# Patient Record
Sex: FEMALE | Race: WHITE | NOT HISPANIC OR LATINO | URBAN - METROPOLITAN AREA
[De-identification: names, ages, dates, MRNs, and addresses within clinical notes are randomized per-mention and may not be internally consistent; named-entity substitution may affect disease eponyms.]

---

## 2020-07-27 ENCOUNTER — TELEPHONE ENCOUNTER (OUTPATIENT)
Dept: URBAN - METROPOLITAN AREA CLINIC 40 | Facility: CLINIC | Age: 70
End: 2020-07-27

## 2021-02-02 ENCOUNTER — ERX REFILL RESPONSE (OUTPATIENT)
Age: 71
End: 2021-02-02

## 2021-02-02 RX ORDER — COLESTIPOL HYDROCHLORIDE 1 G/1
TAKE 1 TABLET BY MOUTH TWICE A DAY TABLET, FILM COATED ORAL
Qty: 180 | Refills: 3

## 2021-02-03 ENCOUNTER — OFFICE VISIT (OUTPATIENT)
Dept: URBAN - METROPOLITAN AREA CLINIC 74 | Facility: CLINIC | Age: 71
End: 2021-02-03
Payer: MEDICARE

## 2021-02-03 DIAGNOSIS — K52.832 LYMPHOCYTIC COLITIS: ICD-10-CM

## 2021-02-03 PROCEDURE — 99212 OFFICE O/P EST SF 10 MIN: CPT | Performed by: INTERNAL MEDICINE

## 2021-02-03 RX ORDER — BUSPIRONE HYDROCHLORIDE 30 MG/1
TAKE 2 TABLETS BY ORAL ROUTE DAILY TABLET ORAL 1
Qty: 0 | Refills: 0 | Status: ACTIVE | COMMUNITY
Start: 1900-01-01

## 2021-02-03 RX ORDER — CETIRIZINE HYDROCHLORIDE 10 MG/1
TAKE 1 CAPSULE BY ORAL ROUTE DAILY CAPSULE, LIQUID FILLED ORAL 1
Qty: 0 | Refills: 0 | Status: ACTIVE | COMMUNITY
Start: 1900-01-01

## 2021-02-03 RX ORDER — CONJUGATED ESTROGENS 0.62 MG/G
APPLLY 2 TIMES PER WEEK CREAM VAGINAL
Qty: 0 | Refills: 0 | Status: DISCONTINUED | COMMUNITY
Start: 1900-01-01

## 2021-02-03 RX ORDER — COLESTIPOL HYDROCHLORIDE 1 G/1
1 TABLET TABLET, FILM COATED ORAL BID
Status: ACTIVE | COMMUNITY

## 2021-02-03 RX ORDER — CLONAZEPAM 1 MG/1
TAKE 1 TABLET (1 MG) BY ORAL ROUTE 3 TIMES PER DAY TABLET ORAL
Qty: 0 | Refills: 0 | Status: ACTIVE | COMMUNITY
Start: 1900-01-01

## 2021-02-03 RX ORDER — PAROXETINE HYDROCHLORIDE 30 MG/1
TAKE 1 TABLET BY ORAL ROUTE 2 TIMES A DAY TABLET, FILM COATED ORAL 2
Qty: 0 | Refills: 0 | Status: ACTIVE | COMMUNITY
Start: 1900-01-01

## 2021-02-03 RX ORDER — COLESTIPOL HYDROCHLORIDE 1 G/1
1 TABLET TABLET, FILM COATED ORAL BID
Qty: 180 TABLET | Refills: 3

## 2021-02-03 NOTE — HPI-TODAY'S VISIT:
Patient presents for evaluation of diarrhea.  Colonoscopy 1 year ago was negative other than diverticulosis and hemorrhoids.  She had diarrhea at that time as well.  Biopsies were consistent with increased intraepithelial lymphocytes consistent with lymphocytic colitis.  She was diagnosed with microscopic colitis and at that time she was doing well with colestipol twice daily and we did not start Entocort.  At this time she continues to do excellent on colestipol twice daily.  Bowels are regular.  No other additional issues.  She simply needs a medication refill.

## 2021-02-08 ENCOUNTER — OFFICE VISIT (OUTPATIENT)
Dept: URBAN - METROPOLITAN AREA CLINIC 74 | Facility: CLINIC | Age: 71
End: 2021-02-08

## 2022-02-02 ENCOUNTER — WEB ENCOUNTER (OUTPATIENT)
Dept: URBAN - METROPOLITAN AREA CLINIC 74 | Facility: CLINIC | Age: 72
End: 2022-02-02

## 2022-02-02 ENCOUNTER — OFFICE VISIT (OUTPATIENT)
Dept: URBAN - METROPOLITAN AREA CLINIC 74 | Facility: CLINIC | Age: 72
End: 2022-02-02
Payer: MEDICARE

## 2022-02-02 DIAGNOSIS — K52.832 LYMPHOCYTIC COLITIS: ICD-10-CM

## 2022-02-02 PROCEDURE — 99213 OFFICE O/P EST LOW 20 MIN: CPT | Performed by: INTERNAL MEDICINE

## 2022-02-02 RX ORDER — COLESTIPOL HYDROCHLORIDE 1 G/1
1 TABLET TABLET, FILM COATED ORAL BID
Qty: 180 TABLET | Refills: 3 | Status: ACTIVE | COMMUNITY

## 2022-02-02 RX ORDER — CLONAZEPAM 1 MG/1
TAKE 1 TABLET (1 MG) BY ORAL ROUTE 3 TIMES PER DAY TABLET ORAL
Qty: 0 | Refills: 0 | Status: ACTIVE | COMMUNITY
Start: 1900-01-01

## 2022-02-02 RX ORDER — COLESTIPOL HYDROCHLORIDE 1 G/1
1 TABLET TABLET, FILM COATED ORAL BID
Qty: 180 TABLET | Refills: 3

## 2022-02-02 RX ORDER — CETIRIZINE HYDROCHLORIDE 10 MG/1
TAKE 1 CAPSULE BY ORAL ROUTE DAILY CAPSULE, LIQUID FILLED ORAL 1
Qty: 0 | Refills: 0 | Status: ACTIVE | COMMUNITY
Start: 1900-01-01

## 2022-02-02 RX ORDER — PAROXETINE HYDROCHLORIDE 30 MG/1
TAKE 1 TABLET BY ORAL ROUTE 2 TIMES A DAY TABLET, FILM COATED ORAL 2
Qty: 0 | Refills: 0 | Status: ACTIVE | COMMUNITY
Start: 1900-01-01

## 2022-02-02 RX ORDER — BUSPIRONE HYDROCHLORIDE 30 MG/1
TAKE 2 TABLETS BY ORAL ROUTE DAILY TABLET ORAL 1
Qty: 0 | Refills: 0 | Status: ACTIVE | COMMUNITY
Start: 1900-01-01

## 2022-02-02 NOTE — HPI-TODAY'S VISIT:
Patient presents for evaluation of diarrhea. Doing well overall. Annual visit.  Colonoscopy 2 years ago was negative other than diverticulosis and hemorrhoids.  She had diarrhea at that time as well.  Biopsies were consistent with increased intraepithelial lymphocytes consistent with lymphocytic colitis.  She was diagnosed with microscopic colitis and at that time she was doing well with colestipol twice daily and we did not start Entocort. Rare wipe bleeding from hemorrhoids. At this time she continues to do excellent on colestipol twice daily.  Bowels are regular.  No other additional issues.  She simply needs a medication refill.

## 2023-02-01 ENCOUNTER — TELEPHONE ENCOUNTER (OUTPATIENT)
Dept: URBAN - METROPOLITAN AREA CLINIC 74 | Facility: CLINIC | Age: 73
End: 2023-02-01

## 2023-02-01 RX ORDER — COLESTIPOL HYDROCHLORIDE 1 G/1
1 TABLET TABLET, FILM COATED ORAL BID
Qty: 180 TABLET | Refills: 3

## 2023-03-06 ENCOUNTER — DASHBOARD ENCOUNTERS (OUTPATIENT)
Age: 73
End: 2023-03-06

## 2023-03-06 ENCOUNTER — OFFICE VISIT (OUTPATIENT)
Dept: URBAN - METROPOLITAN AREA CLINIC 74 | Facility: CLINIC | Age: 73
End: 2023-03-06
Payer: MEDICARE

## 2023-03-06 VITALS
OXYGEN SATURATION: 98 % | HEART RATE: 65 BPM | WEIGHT: 139.2 LBS | DIASTOLIC BLOOD PRESSURE: 62 MMHG | BODY MASS INDEX: 28.06 KG/M2 | HEIGHT: 59 IN | TEMPERATURE: 97.3 F | SYSTOLIC BLOOD PRESSURE: 106 MMHG

## 2023-03-06 DIAGNOSIS — K52.832 LYMPHOCYTIC COLITIS: ICD-10-CM

## 2023-03-06 DIAGNOSIS — Z80.0 FAMILY HX OF COLON CANCER: ICD-10-CM

## 2023-03-06 PROBLEM — 64226004: Status: ACTIVE | Noted: 2021-02-03

## 2023-03-06 PROCEDURE — 99213 OFFICE O/P EST LOW 20 MIN: CPT | Performed by: INTERNAL MEDICINE

## 2023-03-06 RX ORDER — CETIRIZINE HYDROCHLORIDE 10 MG/1
TAKE 1 CAPSULE BY ORAL ROUTE DAILY CAPSULE, LIQUID FILLED ORAL 1
Qty: 0 | Refills: 0 | Status: ACTIVE | COMMUNITY
Start: 1900-01-01

## 2023-03-06 RX ORDER — PAROXETINE HYDROCHLORIDE 30 MG/1
TAKE 1 TABLET BY ORAL ROUTE 2 TIMES A DAY TABLET, FILM COATED ORAL 2
Qty: 0 | Refills: 0 | Status: ACTIVE | COMMUNITY
Start: 1900-01-01

## 2023-03-06 RX ORDER — CLONAZEPAM 1 MG/1
TAKE 1 TABLET (1 MG) BY ORAL ROUTE 3 TIMES PER DAY TABLET ORAL
Qty: 0 | Refills: 0 | Status: ACTIVE | COMMUNITY
Start: 1900-01-01

## 2023-03-06 RX ORDER — COLESTIPOL HYDROCHLORIDE 1 G/1
1 TABLET TABLET, FILM COATED ORAL BID
Qty: 180 TABLET | Refills: 3

## 2023-03-06 RX ORDER — BUSPIRONE HYDROCHLORIDE 30 MG/1
TAKE 2 TABLETS BY ORAL ROUTE DAILY TABLET ORAL 1
Qty: 0 | Refills: 0 | Status: ACTIVE | COMMUNITY
Start: 1900-01-01

## 2023-03-06 RX ORDER — COLESTIPOL HYDROCHLORIDE 1 G/1
1 TABLET TABLET, FILM COATED ORAL BID
Qty: 180 TABLET | Refills: 3 | Status: ACTIVE | COMMUNITY

## 2023-03-06 NOTE — HPI-TODAY'S VISIT:
Follow up for lymphocytic colitis. Doing well overall. Annual visit.  Colonoscopy 3 years ago was negative other than diverticulosis and hemorrhoids.  She had diarrhea at that time as well.  Biopsies were consistent with increased intraepithelial lymphocytes consistent with lymphocytic colitis.  She was diagnosed with microscopic colitis and at that time she was doing well with colestipol twice daily and we did not start Entocort. Rare wipe bleeding from hemorrhoids. At this time she continues to do excellent on colestipol twice daily.  Bowels are regular.  No other additional issues.  She simply needs a medication refill.

## 2023-08-10 ENCOUNTER — TELEPHONE ENCOUNTER (OUTPATIENT)
Dept: URBAN - METROPOLITAN AREA CLINIC 74 | Facility: CLINIC | Age: 73
End: 2023-08-10

## 2025-02-21 ENCOUNTER — TELEPHONE ENCOUNTER (OUTPATIENT)
Dept: URBAN - METROPOLITAN AREA CLINIC 74 | Facility: CLINIC | Age: 75
End: 2025-02-21

## 2025-02-21 RX ORDER — COLESTIPOL HYDROCHLORIDE 1 G/1
1 TABLET TABLET, FILM COATED ORAL TWICE A DAY
Qty: 180 TABLET | Refills: 3

## 2025-03-05 ENCOUNTER — OFFICE VISIT (OUTPATIENT)
Dept: URBAN - METROPOLITAN AREA CLINIC 74 | Facility: CLINIC | Age: 75
End: 2025-03-05